# Patient Record
Sex: MALE | Race: WHITE | Employment: FULL TIME | URBAN - METROPOLITAN AREA
[De-identification: names, ages, dates, MRNs, and addresses within clinical notes are randomized per-mention and may not be internally consistent; named-entity substitution may affect disease eponyms.]

---

## 2022-03-25 ENCOUNTER — HOSPITAL ENCOUNTER (EMERGENCY)
Age: 27
Discharge: HOME | End: 2022-03-25
Attending: EMERGENCY MEDICINE
Payer: OTHER MISCELLANEOUS

## 2022-03-25 VITALS
HEART RATE: 97 BPM | OXYGEN SATURATION: 100 % | BODY MASS INDEX: 27.4 KG/M2 | SYSTOLIC BLOOD PRESSURE: 142 MMHG | DIASTOLIC BLOOD PRESSURE: 77 MMHG | HEIGHT: 76 IN | TEMPERATURE: 98.3 F | RESPIRATION RATE: 16 BRPM | WEIGHT: 225 LBS

## 2022-03-25 DIAGNOSIS — S61.210A LACERATION OF RIGHT INDEX FINGER WITHOUT FOREIGN BODY WITHOUT DAMAGE TO NAIL, INITIAL ENCOUNTER: Primary | ICD-10-CM

## 2022-03-25 PROCEDURE — 99283 EMERGENCY DEPT VISIT LOW MDM: CPT

## 2022-03-25 PROCEDURE — 74011000250 HC RX REV CODE- 250: Performed by: EMERGENCY MEDICINE

## 2022-03-25 PROCEDURE — 75810000293 HC SIMP/SUPERF WND  RPR

## 2022-03-25 RX ORDER — BUPIVACAINE HYDROCHLORIDE 5 MG/ML
10 INJECTION, SOLUTION EPIDURAL; INTRACAUDAL ONCE
Status: COMPLETED | OUTPATIENT
Start: 2022-03-25 | End: 2022-03-25

## 2022-03-25 RX ADMIN — BUPIVACAINE HYDROCHLORIDE 50 MG: 5 INJECTION, SOLUTION EPIDURAL; INTRACAUDAL at 03:13

## 2022-03-25 NOTE — ED PROVIDER NOTES
Per nurses notes: \"Pt ambulatory with EMS from his job at [employer]. Stated he was working on a machine and cut a chunk off of his right 1st digit. Stated it took a long time for the bleeding to stop, but it did finally stop on it's own en route. Last tetanus shot was over 10 years ago per patient. \"    The history is provided by the patient and the EMS personnel. Laceration   The incident occurred less than 1 hour ago. The laceration is located on the right hand. The laceration is 1 cm in size. The injury mechanism is a metal edge. Foreign body present: no. The pain is mild. The pain has been constant since onset. Pertinent negatives include no numbness, no tingling, no weakness, no loss of motion, no coolness and no discoloration. It is unknown when the patient last had a tetanus shot. No past medical history on file. No past surgical history on file. No family history on file. Social History     Socioeconomic History    Marital status: SINGLE     Spouse name: Not on file    Number of children: Not on file    Years of education: Not on file    Highest education level: Not on file   Occupational History    Not on file   Tobacco Use    Smoking status: Never Smoker    Smokeless tobacco: Not on file   Vaping Use    Vaping Use: Never used   Substance and Sexual Activity    Alcohol use: Never    Drug use: Never    Sexual activity: Not on file   Other Topics Concern    Not on file   Social History Narrative    Not on file     Social Determinants of Health     Financial Resource Strain:     Difficulty of Paying Living Expenses: Not on file   Food Insecurity:     Worried About [address] in the Last Year: Not on file    [address] in the Last Year: Not on file   Transportation Needs:     Lack of Transportation (Medical): Not on file    Lack of Transportation (Non-Medical):  Not on file   Physical Activity:     Days of Exercise per Week: Not on file    Minutes of Exercise per Session: Not on file   Stress:     Feeling of Stress : Not on file   Social Connections:     Frequency of Communication with Friends and Family: Not on file    Frequency of Social Gatherings with Friends and Family: Not on file    Attends [employer] Services: Not on file    Active Member of Clubs or Organizations: Not on file    Attends Club or Organization Meetings: Not on file    Marital Status: Not on file   Intimate Partner Violence:     Fear of Current or Ex-Partner: Not on file    Emotionally Abused: Not on file    Physically Abused: Not on file    Sexually Abused: Not on file   Housing Stability:     Unable to Pay for Housing in the Last Year: Not on file    Number of [place] in the Last Year: Not on file    Unstable Housing in the Last Year: Not on file         ALLERGIES: Patient has no known allergies. Review of Systems   Constitutional: Negative for chills and fever. Skin: Positive for wound. Negative for rash. Neurological: Negative for tingling, weakness and numbness. Vitals:    03/25/22 0237 03/25/22 0243   BP: (!) 142/77    Pulse: 97    Resp: 16    Temp: 98.3 °F (36.8 °C)    SpO2: 100%    Weight:  102.1 kg (225 lb)   Height:  6' 4\" (1.93 m)            Physical Exam  Vitals and nursing note reviewed. Constitutional:       General: He is not in acute distress. HENT:      Head: Normocephalic and atraumatic. Eyes:      Extraocular Movements: Extraocular movements intact. Conjunctiva/sclera: Conjunctivae normal.      Pupils: Pupils are equal, round, and reactive to light. Musculoskeletal:         General: Signs of injury present. Normal range of motion. Right hand: Laceration present. Normal range of motion. Arms:    Skin:     General: Skin is warm and dry. Neurological:      General: No focal deficit present. Mental Status: He is alert and oriented to person, place, and time. Sensory: Sensation is intact.       Motor: Motor function is intact. Gait: Gait is intact. Psychiatric:         Mood and Affect: Mood normal.         Behavior: Behavior normal.          MDM  Number of Diagnoses or Management Options  Laceration of right index finger without foreign body without damage to nail, initial encounter: new and does not require workup     Amount and/or Complexity of Data Reviewed  Review and summarize past medical records: yes    Risk of Complications, Morbidity, and/or Mortality  Presenting problems: low  Diagnostic procedures: minimal  Management options: low    Patient Progress  Patient progress: improved         Wound Repair    Date/Time: 3/25/2022 5:00 AM  Performed by: attendingPre-procedure re-eval: Immediately prior to the procedure, the patient was reevaluated and found suitable for the planned procedure and any planned medications. Location details: right index finger  Wound length:2.5 cm or less  Anesthesia: digital block    Anesthesia:  Local Anesthetic: bupivacaine 0.5% without epinephrine  Anesthetic total: 6 mL  Irrigation solution: saline  Irrigation method: syringe  Debridement: none  Wound skin closure material used: 5-0 Ethilon. Number of sutures: 2  Technique: simple  Approximation: close  My total time at bedside, performing this procedure was 1-15 minutes.

## 2022-03-25 NOTE — ED NOTES
I have reviewed discharge instructions with the patient. The patient verbalized understanding. Patient left ED via Discharge Method: ambulatory to Home with (family). Opportunity for questions and clarification provided. Patient given 0 scripts. To continue your aftercare when you leave the hospital, you may receive an automated call from our care team to check in on how you are doing. This is a free service and part of our promise to provide the best care and service to meet your aftercare needs.  If you have questions, or wish to unsubscribe from this service please call [phone]. Thank you for Choosing our [facility].

## 2022-03-25 NOTE — ED TRIAGE NOTES
Pt ambulatory with EMS from his job at [employer]. Stated he was working on a machine and cut a chunk off of his right 1st digit. Stated it took a long time for the bleeding to stop, but it did finally stop on it's own en route. Last tetanus shot was over 10 years ago per patient.